# Patient Record
Sex: FEMALE | Race: WHITE | NOT HISPANIC OR LATINO | Employment: FULL TIME | URBAN - METROPOLITAN AREA
[De-identification: names, ages, dates, MRNs, and addresses within clinical notes are randomized per-mention and may not be internally consistent; named-entity substitution may affect disease eponyms.]

---

## 2017-02-01 ENCOUNTER — TELEPHONE (OUTPATIENT)
Dept: ORTHOPEDIC SURGERY | Facility: CLINIC | Age: 37
End: 2017-02-01

## 2017-02-02 ENCOUNTER — OFFICE VISIT (OUTPATIENT)
Dept: ORTHOPEDIC SURGERY | Facility: CLINIC | Age: 37
End: 2017-02-02

## 2017-02-02 VITALS — BODY MASS INDEX: 22.49 KG/M2 | WEIGHT: 135 LBS | TEMPERATURE: 98.5 F | HEIGHT: 65 IN

## 2017-02-02 DIAGNOSIS — S99.922A FOOT INJURY, LEFT, INITIAL ENCOUNTER: Primary | ICD-10-CM

## 2017-02-02 DIAGNOSIS — S92.525A CLOSED NONDISPLACED FRACTURE OF MIDDLE PHALANX OF LESSER TOE OF LEFT FOOT, INITIAL ENCOUNTER: ICD-10-CM

## 2017-02-02 PROCEDURE — 73630 X-RAY EXAM OF FOOT: CPT | Performed by: ORTHOPAEDIC SURGERY

## 2017-02-02 RX ORDER — LITHIUM CARBONATE 300 MG/1
CAPSULE ORAL
Refills: 0 | COMMUNITY
Start: 2017-01-09

## 2017-02-02 RX ORDER — NAPROXEN 500 MG/1
TABLET ORAL
Refills: 0 | COMMUNITY
Start: 2017-01-30

## 2017-02-02 RX ORDER — QUETIAPINE FUMARATE 300 MG/1
TABLET, FILM COATED ORAL
Refills: 0 | COMMUNITY
Start: 2017-01-09

## 2017-02-02 NOTE — PROGRESS NOTES
Patient evidently left a without being seen.  I did review her x-rays 3 views of the left foot today that were reviewed and ordered for foot pain and compared with x-rays from 1/30/17.  It shows an essentially nondisplaced fracture of the left fifth toe middle phalanx.  We will try to contact her to see if she like to reschedule her appointment.

## 2017-02-03 ENCOUNTER — DOCUMENTATION (OUTPATIENT)
Dept: ORTHOPEDIC SURGERY | Facility: CLINIC | Age: 37
End: 2017-02-03

## 2017-02-03 NOTE — PROGRESS NOTES
Patient left yesterday without being seen.  We contacted her today and she evidently has a relative who works at [facility] and went there for care.